# Patient Record
Sex: MALE | Race: WHITE | NOT HISPANIC OR LATINO | URBAN - METROPOLITAN AREA
[De-identification: names, ages, dates, MRNs, and addresses within clinical notes are randomized per-mention and may not be internally consistent; named-entity substitution may affect disease eponyms.]

---

## 2020-09-18 ENCOUNTER — EMERGENCY (EMERGENCY)
Facility: HOSPITAL | Age: 30
LOS: 1 days | Discharge: HOME | End: 2020-09-18
Attending: EMERGENCY MEDICINE | Admitting: EMERGENCY MEDICINE
Payer: COMMERCIAL

## 2020-09-18 VITALS
HEART RATE: 77 BPM | RESPIRATION RATE: 17 BRPM | DIASTOLIC BLOOD PRESSURE: 75 MMHG | TEMPERATURE: 98 F | SYSTOLIC BLOOD PRESSURE: 137 MMHG | OXYGEN SATURATION: 98 %

## 2020-09-18 VITALS
WEIGHT: 220.02 LBS | HEART RATE: 82 BPM | OXYGEN SATURATION: 99 % | SYSTOLIC BLOOD PRESSURE: 148 MMHG | TEMPERATURE: 98 F | RESPIRATION RATE: 16 BRPM | DIASTOLIC BLOOD PRESSURE: 82 MMHG
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
## 2020-09-18 DIAGNOSIS — S00.81XA ABRASION OF OTHER PART OF HEAD, INITIAL ENCOUNTER: ICD-10-CM
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
## 2020-09-18 DIAGNOSIS — S09.90XA UNSPECIFIED INJURY OF HEAD, INITIAL ENCOUNTER: ICD-10-CM

## 2020-09-18 DIAGNOSIS — S20.212A CONTUSION OF LEFT FRONT WALL OF THORAX, INITIAL ENCOUNTER: ICD-10-CM
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
## 2020-09-18 DIAGNOSIS — Z23 ENCOUNTER FOR IMMUNIZATION: ICD-10-CM

## 2020-09-18 PROCEDURE — 70450 CT HEAD/BRAIN W/O DYE: CPT | Mod: 26

## 2020-09-18 PROCEDURE — 99284 EMERGENCY DEPT VISIT MOD MDM: CPT

## 2020-09-18 PROCEDURE — 70486 CT MAXILLOFACIAL W/O DYE: CPT | Mod: 26

## 2020-09-18 PROCEDURE — 71046 X-RAY EXAM CHEST 2 VIEWS: CPT | Mod: 26

## 2020-09-18 PROCEDURE — 73130 X-RAY EXAM OF HAND: CPT | Mod: 26,50

## 2020-09-18 PROCEDURE — 73110 X-RAY EXAM OF WRIST: CPT | Mod: 26,LT

## 2020-09-18 RX ORDER — IBUPROFEN 200 MG
600 TABLET ORAL ONCE
Refills: 0 | Status: COMPLETED | OUTPATIENT
Start: 2020-09-18 | End: 2020-09-18

## 2020-09-18 RX ORDER — ACETAMINOPHEN 500 MG
975 TABLET ORAL ONCE
Refills: 0 | Status: COMPLETED | OUTPATIENT
Start: 2020-09-18 | End: 2020-09-18

## 2020-09-18 RX ORDER — TETANUS TOXOID, REDUCED DIPHTHERIA TOXOID AND ACELLULAR PERTUSSIS VACCINE, ADSORBED 5; 2.5; 8; 8; 2.5 [IU]/.5ML; [IU]/.5ML; UG/.5ML; UG/.5ML; UG/.5ML
0.5 SUSPENSION INTRAMUSCULAR ONCE
Refills: 0 | Status: COMPLETED | OUTPATIENT
Start: 2020-09-18 | End: 2020-09-18

## 2020-09-18 RX ADMIN — Medication 975 MILLIGRAM(S): at 14:28

## 2020-09-18 RX ADMIN — TETANUS TOXOID, REDUCED DIPHTHERIA TOXOID AND ACELLULAR PERTUSSIS VACCINE, ADSORBED 0.5 MILLILITER(S): 5; 2.5; 8; 8; 2.5 SUSPENSION INTRAMUSCULAR at 14:28

## 2020-09-18 RX ADMIN — Medication 600 MILLIGRAM(S): at 16:45

## 2020-09-18 NOTE — ED PROVIDER NOTE - PHYSICAL EXAMINATION
Physical Exam  GEN: Awake, alert, non-toxic appearing, NCAT  EYES: full EOMI, perrl, no chemosis/hyphema, no periorbital swelling,   ENT: External inspection normal, normal voice, R forehead abrasion, R chin abrasions  HEAD: no hematoma  NECK: FROM neck, supple,   RESP: no tachypnea, no hypoxia, no resp distress,  GI: nontender abd,   MSK: L hand: tenderness to the base of thenar, no obvious deformity, no snuffbox tenderness, no pain w/ axial loading, no bony tenderness to phalanges or metacarpal, R hand: no obvious deformity, no snuffbox tenderness, no pain w/ axial loading, no bony tenderness to phalanges or metacarpal, R knee: no swelling/effusion/tenderness, FROM, no clavicle/chest wall tenderness  SKIN: cap refill < 2 sec, several small abrasions noted to bl hands and phalanges,   NEURO: ao x 4, strength equal bl, steady gait, clear speech, perrl, full eomi Physical Exam  GEN: Awake, alert, non-toxic appearing, NCAT  EYES: full EOMI, perrl, no chemosis/hyphema, no periorbital swelling,   ENT: External inspection normal, normal voice, R forehead abrasion, R chin abrasions, R maxilla abrasion  HEAD: no hematoma  NECK: FROM neck, supple,   RESP: no tachypnea, no hypoxia, no resp distress,  GI: nontender abd,   MSK: L hand: tenderness to the base of thenar, no obvious deformity, no snuffbox tenderness, no pain w/ axial loading, no bony tenderness to phalanges or metacarpal, R hand: no obvious deformity, no snuffbox tenderness, no pain w/ axial loading, no bony tenderness to phalanges or metacarpal, R knee: no swelling/effusion/tenderness, FROM, no clavicle/abd wall tenderness, small ecchymosis noted to L chest wall  SKIN: cap refill < 2 sec, several small abrasions noted to bl hands and phalanges (over L hand 4th and 5th PIP)  NEURO: ao x 4, strength equal bl, steady gait, clear speech, perrl, full eomi

## 2020-09-18 NOTE — ED PROVIDER NOTE - CLINICAL SUMMARY MEDICAL DECISION MAKING FREE TEXT BOX
ct/xray eval for trauma, tdap, no nv compromise to extremities, soft compartment, ct/xray eval for trauma, tdap, no nv compromise to extremities, soft compartment, wound care    ekg appears no overt ischemic findings, low suspicion for cardiac contusion
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
## 2020-09-18 NOTE — ED PROVIDER NOTE - NSFOLLOWUPINSTRUCTIONS_ED_ALL_ED_FT
Follow up with your primary care doctor  We updated your tetanus immunization today 9/2020  Alternate tylenol/motrin if you do not have any allergies/intolerance   You can take 600mg of motrin every 6 hours with food as needed  You can take 1000mg of tylenol every 6 hours as needed  Ice for swelling as needed (5-10 minutes every hour)  Keep it elevated to reduce swelling  Avoid sun exposure with the wounds  Gentle cleaning with soap and water and apply neosporin/bacitracin ointment after cleaning  Follow up with a dermatologist/plastic surgeon if you have any cosmetic concern  Return immediately for any new or worsening symptoms or any new concerns
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
## 2020-09-18 NOTE — ED PROVIDER NOTE - PATIENT PORTAL LINK FT
You can access the FollowMyHealth Patient Portal offered by [facility] by registering at the following website: [email or web address]. By joining [email or web address]’s FollowMyHealth portal, you will also be able to view your health information using other applications (apps) compatible with our system.
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
